# Patient Record
Sex: MALE | Race: BLACK OR AFRICAN AMERICAN | NOT HISPANIC OR LATINO | Employment: PART TIME | URBAN - METROPOLITAN AREA
[De-identification: names, ages, dates, MRNs, and addresses within clinical notes are randomized per-mention and may not be internally consistent; named-entity substitution may affect disease eponyms.]

---

## 2021-09-15 LAB
BASOPHILS # BLD AUTO: 0 10E3/UL (ref 0–0.2)
BASOPHILS NFR BLD AUTO: 0 %
EOSINOPHIL # BLD AUTO: 0.1 10E3/UL (ref 0–0.7)
EOSINOPHIL NFR BLD AUTO: 2 %
ERYTHROCYTE [DISTWIDTH] IN BLOOD BY AUTOMATED COUNT: 12.2 % (ref 10–15)
HCT VFR BLD AUTO: 41.5 % (ref 40–53)
HGB BLD-MCNC: 14.2 G/DL (ref 13.3–17.7)
IMM GRANULOCYTES # BLD: 0 10E3/UL
IMM GRANULOCYTES NFR BLD: 0 %
LYMPHOCYTES # BLD AUTO: 2.5 10E3/UL (ref 0.8–5.3)
LYMPHOCYTES NFR BLD AUTO: 36 %
MCH RBC QN AUTO: 28.5 PG (ref 26.5–33)
MCHC RBC AUTO-ENTMCNC: 34.2 G/DL (ref 31.5–36.5)
MCV RBC AUTO: 83 FL (ref 78–100)
MONOCYTES # BLD AUTO: 0.5 10E3/UL (ref 0–1.3)
MONOCYTES NFR BLD AUTO: 7 %
NEUTROPHILS # BLD AUTO: 3.7 10E3/UL (ref 1.6–8.3)
NEUTROPHILS NFR BLD AUTO: 55 %
NRBC # BLD AUTO: 0 10E3/UL
NRBC BLD AUTO-RTO: 0 /100
PLATELET # BLD AUTO: 239 10E3/UL (ref 150–450)
RBC # BLD AUTO: 4.99 10E6/UL (ref 4.4–5.9)
WBC # BLD AUTO: 6.8 10E3/UL (ref 4–11)

## 2021-09-15 PROCEDURE — 36415 COLL VENOUS BLD VENIPUNCTURE: CPT | Performed by: EMERGENCY MEDICINE

## 2021-09-15 PROCEDURE — 80053 COMPREHEN METABOLIC PANEL: CPT | Performed by: EMERGENCY MEDICINE

## 2021-09-15 PROCEDURE — 99285 EMERGENCY DEPT VISIT HI MDM: CPT | Mod: 25

## 2021-09-15 PROCEDURE — 85025 COMPLETE CBC W/AUTO DIFF WBC: CPT | Performed by: EMERGENCY MEDICINE

## 2021-09-15 PROCEDURE — 83690 ASSAY OF LIPASE: CPT | Performed by: EMERGENCY MEDICINE

## 2021-09-15 PROCEDURE — 96374 THER/PROPH/DIAG INJ IV PUSH: CPT | Mod: 59

## 2021-09-15 PROCEDURE — 96361 HYDRATE IV INFUSION ADD-ON: CPT

## 2021-09-15 ASSESSMENT — MIFFLIN-ST. JEOR: SCORE: 1668.63

## 2021-09-16 ENCOUNTER — HOSPITAL ENCOUNTER (EMERGENCY)
Facility: CLINIC | Age: 51
Discharge: HOME | End: 2021-09-16
Attending: EMERGENCY MEDICINE | Admitting: EMERGENCY MEDICINE
Payer: COMMERCIAL

## 2021-09-16 ENCOUNTER — APPOINTMENT (OUTPATIENT)
Dept: CT IMAGING | Facility: CLINIC | Age: 51
End: 2021-09-16
Attending: EMERGENCY MEDICINE
Payer: COMMERCIAL

## 2021-09-16 VITALS
DIASTOLIC BLOOD PRESSURE: 94 MMHG | HEIGHT: 67 IN | TEMPERATURE: 98 F | RESPIRATION RATE: 20 BRPM | WEIGHT: 187.39 LBS | SYSTOLIC BLOOD PRESSURE: 146 MMHG | OXYGEN SATURATION: 100 % | BODY MASS INDEX: 29.41 KG/M2 | HEART RATE: 84 BPM

## 2021-09-16 DIAGNOSIS — R19.7 DIARRHEA OF PRESUMED INFECTIOUS ORIGIN: ICD-10-CM

## 2021-09-16 LAB
ALBUMIN SERPL-MCNC: 3.5 G/DL (ref 3.4–5)
ALP SERPL-CCNC: 88 U/L (ref 40–150)
ALT SERPL W P-5'-P-CCNC: 37 U/L (ref 0–70)
ANION GAP SERPL CALCULATED.3IONS-SCNC: 2 MMOL/L (ref 3–14)
AST SERPL W P-5'-P-CCNC: 13 U/L (ref 0–45)
BILIRUB SERPL-MCNC: 0.3 MG/DL (ref 0.2–1.3)
BUN SERPL-MCNC: 11 MG/DL (ref 7–30)
C COLI+JEJUNI+LARI FUSA STL QL NAA+PROBE: NOT DETECTED
C DIFF TOX B STL QL: NEGATIVE
CALCIUM SERPL-MCNC: 8.6 MG/DL (ref 8.5–10.1)
CHLORIDE BLD-SCNC: 105 MMOL/L (ref 94–109)
CO2 SERPL-SCNC: 31 MMOL/L (ref 20–32)
CREAT SERPL-MCNC: 0.87 MG/DL (ref 0.66–1.25)
EC STX1 GENE STL QL NAA+PROBE: NOT DETECTED
EC STX2 GENE STL QL NAA+PROBE: NOT DETECTED
GFR SERPL CREATININE-BSD FRML MDRD: >90 ML/MIN/1.73M2
GLUCOSE BLD-MCNC: 159 MG/DL (ref 70–99)
HOLD SPECIMEN: NORMAL
HOLD SPECIMEN: NORMAL
LIPASE SERPL-CCNC: 78 U/L (ref 73–393)
NOROV GI+II ORF1-ORF2 JNC STL QL NAA+PR: NOT DETECTED
POTASSIUM BLD-SCNC: 3.2 MMOL/L (ref 3.4–5.3)
PROT SERPL-MCNC: 7.3 G/DL (ref 6.8–8.8)
RVA NSP5 STL QL NAA+PROBE: NOT DETECTED
SALMONELLA SP RPOD STL QL NAA+PROBE: NOT DETECTED
SHIGELLA SP+EIEC IPAH STL QL NAA+PROBE: NOT DETECTED
SODIUM SERPL-SCNC: 138 MMOL/L (ref 133–144)
V CHOL+PARA RFBL+TRKH+TNAA STL QL NAA+PR: NOT DETECTED
Y ENTERO RECN STL QL NAA+PROBE: NOT DETECTED

## 2021-09-16 PROCEDURE — 250N000013 HC RX MED GY IP 250 OP 250 PS 637: Performed by: EMERGENCY MEDICINE

## 2021-09-16 PROCEDURE — 87493 C DIFF AMPLIFIED PROBE: CPT | Performed by: EMERGENCY MEDICINE

## 2021-09-16 PROCEDURE — 87506 IADNA-DNA/RNA PROBE TQ 6-11: CPT | Performed by: EMERGENCY MEDICINE

## 2021-09-16 PROCEDURE — 258N000003 HC RX IP 258 OP 636: Performed by: EMERGENCY MEDICINE

## 2021-09-16 PROCEDURE — 250N000009 HC RX 250: Performed by: EMERGENCY MEDICINE

## 2021-09-16 PROCEDURE — 250N000011 HC RX IP 250 OP 636: Performed by: EMERGENCY MEDICINE

## 2021-09-16 PROCEDURE — 74177 CT ABD & PELVIS W/CONTRAST: CPT

## 2021-09-16 RX ORDER — ACETAMINOPHEN 500 MG
1000 TABLET ORAL ONCE
Status: COMPLETED | OUTPATIENT
Start: 2021-09-16 | End: 2021-09-16

## 2021-09-16 RX ORDER — IOPAMIDOL 755 MG/ML
94 INJECTION, SOLUTION INTRAVASCULAR ONCE
Status: COMPLETED | OUTPATIENT
Start: 2021-09-16 | End: 2021-09-16

## 2021-09-16 RX ORDER — IBUPROFEN 600 MG/1
600 TABLET, FILM COATED ORAL ONCE
Status: COMPLETED | OUTPATIENT
Start: 2021-09-16 | End: 2021-09-16

## 2021-09-16 RX ADMIN — IOPAMIDOL 94 ML: 755 INJECTION, SOLUTION INTRAVENOUS at 02:12

## 2021-09-16 RX ADMIN — IBUPROFEN 600 MG: 600 TABLET ORAL at 02:01

## 2021-09-16 RX ADMIN — SODIUM CHLORIDE 67 ML: 9 INJECTION, SOLUTION INTRAVENOUS at 02:13

## 2021-09-16 RX ADMIN — FAMOTIDINE 20 MG: 10 INJECTION, SOLUTION INTRAVENOUS at 02:05

## 2021-09-16 RX ADMIN — SODIUM CHLORIDE, POTASSIUM CHLORIDE, SODIUM LACTATE AND CALCIUM CHLORIDE 1000 ML: 600; 310; 30; 20 INJECTION, SOLUTION INTRAVENOUS at 02:04

## 2021-09-16 RX ADMIN — ACETAMINOPHEN 1000 MG: 500 TABLET, FILM COATED ORAL at 02:01

## 2021-09-16 ASSESSMENT — ENCOUNTER SYMPTOMS
DIARRHEA: 1
NAUSEA: 0
FEVER: 0
VOMITING: 0
SHORTNESS OF BREATH: 0
BLOOD IN STOOL: 0
COUGH: 0
ABDOMINAL PAIN: 1

## 2021-09-16 NOTE — ED PROVIDER NOTES
"  History     Chief Complaint:    Abdominal Pain      HPI   Francisco Abernathy is a 51 year old male who presents with abdominal pain. The abdominal pain started back on this past Saturday. He scheduled an appointment with a doctor, but that the pain worsened to the point that he came to the ED before the appointment with the doctor. He also has had 10-11 episodes of diarrhea today as well. The diarrhea has gotten to the point where he notices some rectal bruising and a burning sensation in his abdomen. He has no fever, nausea or vomiting. No one else is sick around him, and he had no recent surgeries. He denies the use of tobacco and alcohol.    Review of Systems   Constitutional: Negative for fever.   Respiratory: Negative for cough and shortness of breath.    Cardiovascular: Negative for chest pain.   Gastrointestinal: Positive for abdominal pain and diarrhea. Negative for blood in stool, nausea and vomiting.   All other systems reviewed and are negative.        Allergies:  No Known Allergies      Medications:    No known medicatins      Past Medical History:    Patient denies past medical history     Social History:  Patient presents to the ED alone  Patient does not speak english    Physical Exam     Patient Vitals for the past 24 hrs:   BP Temp Temp src Pulse Resp SpO2 Height Weight   09/16/21 0245 -- -- -- -- -- 99 % -- --   09/16/21 0230 -- -- -- -- -- 100 % -- --   09/16/21 0210 -- -- -- -- -- 100 % -- --   09/15/21 2341 (!) 148/93 98  F (36.7  C) Temporal 91 20 99 % 1.702 m (5' 7\") 85 kg (187 lb 6.3 oz)       Physical Exam  General: Appears well-developed and well-nourished.   Head: No signs of trauma.   Mouth/Throat: Oropharynx is clear and moist.   CV: Normal rate and regular rhythm.    Resp: Effort normal and breath sounds normal. No respiratory distress.   GI: Soft. There is mild diffuse tenderness.  No rebound or guarding.  Normal bowel sounds.  No CVA tenderness.  MSK: Normal range of motion.   Neuro: " The patient is alert and oriented. Speech normal.  Skin: Skin is warm and dry. No rash noted.   Psych: normal mood and affect. behavior is normal.       Emergency Department Course     Imaging:  CT Abdomen Pelvis w Contrast  Preliminary Result  IMPRESSION: No acute abnormality identified in the abdomen or pelvis. Normal appendix.         Laboratory:  Enteric bacteria and virus panel by [facility] stool: pending  Clostridium difficile toxin B PCR: pending  CBC: WBC 6.8, HGB 14.2,   CMP: Potassium 3.2 L, Anion Gap 2 L, Glucose 159 H, o/w WNL (Creatinine 0.87)  Lipase: 78        Emergency Department Course:    Reviewed:  I reviewed nursing notes, vitals and past history    Assessments:  0126 I obtained history and examined the patient as noted above.   0340 I rechecked the patient and explained findings.         Interventions:  0201 Ibuprofen 600 mg PO  0201 Tylenol 1000 mg PO  0204 Lactated ringers 1000 mL IV  0205 Famotidine 20 mg IV    Disposition:  The patient was discharged to home.    Impression & Plan      Medical Decision Making:  Francisco Abernathy is a 51-year-old gentleman who presents due to abdominal pain and diarrhea.  He has had symptoms over the last few days before coming to the hospital. He has not had any blood in his stool and denies any fevers.  He has not taken anything for his symptoms. On my evaluation he did have some diffuse tenderness.  Blood work was obtained that was overall unremarkable. I did obtain a CT scan and this did not show any signs of acute process.  Patient was given IV fluids along with Tylenol and ibuprofen and did feel better.  After discussion with patient of the pros and cons, I did discuss that he could use Imodium to help slow his diarrhea along with continued symptomatic control and pushing plenty of fluids.  I also discussed drinking Gatorade to help both with energy and electrolytes.  Stool studies were sent and are pending.  Primary care doctor was given for follow up.   He also asked to see a stomach specialist.  I did give him the information for GI, but discussed that sometimes can take some time for him to see a specialist. He was instructed to return for worsening symptoms or further concerns.       Diagnosis:    ICD-10-CM    1. Diarrhea of presumed infectious origin  R19.7        Discharge Medications:  New Prescriptions    No medications on file         Scribe Disclosure:  ILivia Hired, am serving as a scribe at 1:25 AM on 9/16/2021 to document services personally performed by Saul Celis MD based on my observations and the provider's statements to me.      Saul Celis MD  09/16/21 0671

## 2021-09-16 NOTE — RESULT ENCOUNTER NOTE
Final Enteric Bacteria and Virus Panel by [facility] Stool is NEGATIVE for all tested organisms (bacteria/virus).  No treatment or change in treatment per [facility] Lab Result Enteric Bacteria and Virus Panel protocol.

## 2021-09-16 NOTE — DISCHARGE INSTRUCTIONS
Please continue with tylenol, ibuprofen, and plenty of fluids.  Please follow up with your doctor.  Your stool studies are still pending at this time.  If they are positive you will be contacted.    Purchase IMODIUM anti-diarrhea medicine and  Gatorade Sports Drink to keep you hydrated.

## 2021-09-16 NOTE — ED TRIAGE NOTES
Pt has had diarrhea started Saturday. Pt c/o cramping. Pt had an appointment scheduled for tomorrow but decided to come to the ER instead. Pt denies nausea. Denies bloody stools

## 2021-09-16 NOTE — RESULT ENCOUNTER NOTE
Final Clostridium Difficile toxin B PCR is NEGATIVE.    No treatment or change in treatment per [facility] ED Lab Result Clostridium difficile protocol.

## 2021-11-28 ENCOUNTER — HOSPITAL ENCOUNTER (EMERGENCY)
Facility: CLINIC | Age: 51
Discharge: HOME | End: 2021-11-28
Attending: NURSE PRACTITIONER | Admitting: NURSE PRACTITIONER
Payer: COMMERCIAL

## 2021-11-28 VITALS
SYSTOLIC BLOOD PRESSURE: 148 MMHG | HEART RATE: 91 BPM | OXYGEN SATURATION: 99 % | TEMPERATURE: 97.7 F | RESPIRATION RATE: 16 BRPM | DIASTOLIC BLOOD PRESSURE: 83 MMHG

## 2021-11-28 DIAGNOSIS — T23.221A PARTIAL THICKNESS BURN OF FINGER OF RIGHT HAND, INITIAL ENCOUNTER: ICD-10-CM

## 2021-11-28 PROCEDURE — 99283 EMERGENCY DEPT VISIT LOW MDM: CPT | Mod: 25

## 2021-11-28 PROCEDURE — 250N000009 HC RX 250: Performed by: NURSE PRACTITIONER

## 2021-11-28 PROCEDURE — 16020 DRESS/DEBRID P-THICK BURN S: CPT

## 2021-11-28 RX ORDER — SILVER SULFADIAZINE 10 MG/G
1 CREAM TOPICAL DAILY
Status: DISCONTINUED | OUTPATIENT
Start: 2021-11-28 | End: 2021-11-28 | Stop reason: HOSPADM

## 2021-11-28 RX ORDER — CEPHALEXIN 500 MG/1
500 CAPSULE ORAL 4 TIMES DAILY
Qty: 28 CAPSULE | Refills: 0 | Status: SHIPPED | OUTPATIENT
Start: 2021-11-28 | End: 2021-12-05

## 2021-11-28 RX ORDER — HYDROCODONE BITARTRATE AND ACETAMINOPHEN 5; 325 MG/1; MG/1
1 TABLET ORAL EVERY 6 HOURS PRN
Qty: 10 TABLET | Refills: 0 | Status: SHIPPED | OUTPATIENT
Start: 2021-11-28 | End: 2021-12-01

## 2021-11-28 RX ADMIN — SILVER SULFADIAZINE 1 G: 10 CREAM TOPICAL at 17:12

## 2021-11-28 ASSESSMENT — ENCOUNTER SYMPTOMS: WOUND: 1
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
## 2021-11-29 NOTE — DISCHARGE INSTRUCTIONS
Apply the Silvadene cream to the area daily. Change the dressing each day. Waer the splint until your thumb 9s healed.    Take the antibiotics four times a day for one week.

## 2022-03-08 ENCOUNTER — APPOINTMENT (OUTPATIENT)
Dept: GENERAL RADIOLOGY | Facility: CLINIC | Age: 52
End: 2022-03-08
Attending: EMERGENCY MEDICINE
Payer: COMMERCIAL

## 2022-03-08 ENCOUNTER — HOSPITAL ENCOUNTER (EMERGENCY)
Facility: CLINIC | Age: 52
Discharge: HOME | End: 2022-03-09
Attending: EMERGENCY MEDICINE | Admitting: EMERGENCY MEDICINE
Payer: COMMERCIAL

## 2022-03-08 ENCOUNTER — APPOINTMENT (OUTPATIENT)
Dept: CT IMAGING | Facility: CLINIC | Age: 52
End: 2022-03-08
Attending: EMERGENCY MEDICINE
Payer: COMMERCIAL

## 2022-03-08 DIAGNOSIS — S16.1XXA STRAIN OF NECK MUSCLE, INITIAL ENCOUNTER: ICD-10-CM

## 2022-03-08 DIAGNOSIS — S20.212A CONTUSION OF LEFT CHEST WALL, INITIAL ENCOUNTER: ICD-10-CM

## 2022-03-08 DIAGNOSIS — T14.8XXA CONTUSION OF LEFT CLAVICLE, INITIAL ENCOUNTER: ICD-10-CM

## 2022-03-08 PROCEDURE — 72125 CT NECK SPINE W/O DYE: CPT

## 2022-03-08 PROCEDURE — 99285 EMERGENCY DEPT VISIT HI MDM: CPT | Mod: 25

## 2022-03-08 PROCEDURE — 71101 X-RAY EXAM UNILAT RIBS/CHEST: CPT | Mod: LT

## 2022-03-08 PROCEDURE — 73030 X-RAY EXAM OF SHOULDER: CPT | Mod: LT

## 2022-03-08 PROCEDURE — 250N000013 HC RX MED GY IP 250 OP 250 PS 637: Performed by: EMERGENCY MEDICINE

## 2022-03-08 RX ORDER — IBUPROFEN 600 MG/1
600 TABLET, FILM COATED ORAL ONCE
Status: COMPLETED | OUTPATIENT
Start: 2022-03-08 | End: 2022-03-08

## 2022-03-08 RX ADMIN — IBUPROFEN 600 MG: 600 TABLET ORAL at 21:28

## 2022-03-08 ASSESSMENT — ENCOUNTER SYMPTOMS
NECK PAIN: 1
ARTHRALGIAS: 1

## 2022-03-08 NOTE — LETTER
March 9, 2022      To Whom It May Concern:      Francisco Abernathy was seen in our Emergency Department today, 03/09/22.  I expect his condition to improve over the next 3-4 days.  He may return to work when improved.    Sincerely,          Karoline Valentine RN

## 2022-03-08 NOTE — LETTER
March 9, 2022      To Whom It May Concern:      Francisco Abernathy was seen in our Emergency Department today, 03/09/22.  I expect his condition to improve over the next few days.  He may return to work/school when improved.    Sincerely,        Karoline Valentine RN

## 2022-03-09 VITALS
OXYGEN SATURATION: 98 % | DIASTOLIC BLOOD PRESSURE: 81 MMHG | WEIGHT: 185.19 LBS | HEART RATE: 103 BPM | BODY MASS INDEX: 29 KG/M2 | RESPIRATION RATE: 18 BRPM | TEMPERATURE: 98.3 F | SYSTOLIC BLOOD PRESSURE: 145 MMHG

## 2022-03-09 RX ORDER — IBUPROFEN 600 MG/1
600 TABLET, FILM COATED ORAL EVERY 6 HOURS PRN
Qty: 15 TABLET | Refills: 0 | Status: SHIPPED | OUTPATIENT
Start: 2022-03-09

## 2022-03-09 RX ORDER — HYDROCODONE BITARTRATE AND ACETAMINOPHEN 5; 325 MG/1; MG/1
1 TABLET ORAL EVERY 4 HOURS PRN
Qty: 12 TABLET | Refills: 0 | Status: SHIPPED | OUTPATIENT
Start: 2022-03-09
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
## 2022-03-20 ENCOUNTER — HOSPITAL ENCOUNTER (EMERGENCY)
Facility: CLINIC | Age: 52
End: 2022-03-20

## 2024-04-04 ENCOUNTER — HOSPITAL ENCOUNTER (EMERGENCY)
Facility: CLINIC | Age: 54
Discharge: HOME | End: 2024-04-05
Attending: EMERGENCY MEDICINE | Admitting: EMERGENCY MEDICINE
Payer: COMMERCIAL

## 2024-04-04 DIAGNOSIS — N20.1 RIGHT URETERAL STONE: ICD-10-CM

## 2024-04-04 LAB
ALBUMIN UR-MCNC: NEGATIVE MG/DL
APPEARANCE UR: CLEAR
BASOPHILS # BLD AUTO: 0 10E3/UL (ref 0–0.2)
BASOPHILS NFR BLD AUTO: 1 %
BILIRUB UR QL STRIP: NEGATIVE
COLOR UR AUTO: ABNORMAL
EOSINOPHIL # BLD AUTO: 0.2 10E3/UL (ref 0–0.7)
EOSINOPHIL NFR BLD AUTO: 4 %
ERYTHROCYTE [DISTWIDTH] IN BLOOD BY AUTOMATED COUNT: 12.3 % (ref 10–15)
GLUCOSE UR STRIP-MCNC: 150 MG/DL
HCT VFR BLD AUTO: 40.1 % (ref 40–53)
HGB BLD-MCNC: 13.5 G/DL (ref 13.3–17.7)
HGB UR QL STRIP: ABNORMAL
IMM GRANULOCYTES # BLD: 0 10E3/UL
IMM GRANULOCYTES NFR BLD: 0 %
KETONES UR STRIP-MCNC: NEGATIVE MG/DL
LEUKOCYTE ESTERASE UR QL STRIP: NEGATIVE
LYMPHOCYTES # BLD AUTO: 2.2 10E3/UL (ref 0.8–5.3)
LYMPHOCYTES NFR BLD AUTO: 34 %
MCH RBC QN AUTO: 29 PG (ref 26.5–33)
MCHC RBC AUTO-ENTMCNC: 33.7 G/DL (ref 31.5–36.5)
MCV RBC AUTO: 86 FL (ref 78–100)
MONOCYTES # BLD AUTO: 0.6 10E3/UL (ref 0–1.3)
MONOCYTES NFR BLD AUTO: 9 %
MUCOUS THREADS #/AREA URNS LPF: PRESENT /LPF
NEUTROPHILS # BLD AUTO: 3.3 10E3/UL (ref 1.6–8.3)
NEUTROPHILS NFR BLD AUTO: 52 %
NITRATE UR QL: NEGATIVE
NRBC # BLD AUTO: 0 10E3/UL
NRBC BLD AUTO-RTO: 0 /100
PH UR STRIP: 5.5 [PH] (ref 5–7)
PLATELET # BLD AUTO: 236 10E3/UL (ref 150–450)
RBC # BLD AUTO: 4.66 10E6/UL (ref 4.4–5.9)
RBC URINE: 32 /HPF
SP GR UR STRIP: 1.02 (ref 1–1.03)
UROBILINOGEN UR STRIP-MCNC: 2 MG/DL
WBC # BLD AUTO: 6.3 10E3/UL (ref 4–11)
WBC URINE: 11 /HPF

## 2024-04-04 PROCEDURE — 82565 ASSAY OF CREATININE: CPT | Performed by: EMERGENCY MEDICINE

## 2024-04-04 PROCEDURE — 87086 URINE CULTURE/COLONY COUNT: CPT | Performed by: EMERGENCY MEDICINE

## 2024-04-04 PROCEDURE — 99285 EMERGENCY DEPT VISIT HI MDM: CPT | Mod: 25

## 2024-04-04 PROCEDURE — 85025 COMPLETE CBC W/AUTO DIFF WBC: CPT | Performed by: EMERGENCY MEDICINE

## 2024-04-04 PROCEDURE — 81001 URINALYSIS AUTO W/SCOPE: CPT | Performed by: EMERGENCY MEDICINE

## 2024-04-04 PROCEDURE — 36415 COLL VENOUS BLD VENIPUNCTURE: CPT | Performed by: EMERGENCY MEDICINE

## 2024-04-04 ASSESSMENT — ACTIVITIES OF DAILY LIVING (ADL): ADLS_ACUITY_SCORE: 33

## 2024-04-05 ENCOUNTER — APPOINTMENT (OUTPATIENT)
Dept: CT IMAGING | Facility: CLINIC | Age: 54
End: 2024-04-05
Attending: EMERGENCY MEDICINE
Payer: COMMERCIAL

## 2024-04-05 VITALS
WEIGHT: 174 LBS | TEMPERATURE: 98.4 F | RESPIRATION RATE: 16 BRPM | OXYGEN SATURATION: 98 % | SYSTOLIC BLOOD PRESSURE: 140 MMHG | HEART RATE: 78 BPM | DIASTOLIC BLOOD PRESSURE: 81 MMHG | BODY MASS INDEX: 27.25 KG/M2

## 2024-04-05 LAB
ANION GAP SERPL CALCULATED.3IONS-SCNC: 9 MMOL/L (ref 7–15)
BUN SERPL-MCNC: 23.3 MG/DL (ref 6–20)
CALCIUM SERPL-MCNC: 8.7 MG/DL (ref 8.6–10)
CHLORIDE SERPL-SCNC: 102 MMOL/L (ref 98–107)
CREAT SERPL-MCNC: 1.32 MG/DL (ref 0.67–1.17)
DEPRECATED HCO3 PLAS-SCNC: 26 MMOL/L (ref 22–29)
EGFRCR SERPLBLD CKD-EPI 2021: 64 ML/MIN/1.73M2
GLUCOSE SERPL-MCNC: 150 MG/DL (ref 70–99)
POTASSIUM SERPL-SCNC: 4.3 MMOL/L (ref 3.4–5.3)
SODIUM SERPL-SCNC: 137 MMOL/L (ref 135–145)

## 2024-04-05 PROCEDURE — 96375 TX/PRO/DX INJ NEW DRUG ADDON: CPT

## 2024-04-05 PROCEDURE — 74176 CT ABD & PELVIS W/O CONTRAST: CPT

## 2024-04-05 PROCEDURE — 96361 HYDRATE IV INFUSION ADD-ON: CPT

## 2024-04-05 PROCEDURE — 96374 THER/PROPH/DIAG INJ IV PUSH: CPT

## 2024-04-05 PROCEDURE — 250N000011 HC RX IP 250 OP 636: Performed by: EMERGENCY MEDICINE

## 2024-04-05 PROCEDURE — 258N000003 HC RX IP 258 OP 636: Performed by: EMERGENCY MEDICINE

## 2024-04-05 RX ORDER — HYDROMORPHONE HYDROCHLORIDE 1 MG/ML
0.5 INJECTION, SOLUTION INTRAMUSCULAR; INTRAVENOUS; SUBCUTANEOUS EVERY 30 MIN PRN
Status: DISCONTINUED | OUTPATIENT
Start: 2024-04-05 | End: 2024-04-05 | Stop reason: HOSPADM

## 2024-04-05 RX ORDER — ONDANSETRON 2 MG/ML
4 INJECTION INTRAMUSCULAR; INTRAVENOUS EVERY 30 MIN PRN
Status: DISCONTINUED | OUTPATIENT
Start: 2024-04-05 | End: 2024-04-05 | Stop reason: HOSPADM

## 2024-04-05 RX ORDER — ONDANSETRON 4 MG/1
4 TABLET, ORALLY DISINTEGRATING ORAL EVERY 8 HOURS PRN
Qty: 10 TABLET | Refills: 0 | Status: SHIPPED | OUTPATIENT
Start: 2024-04-05 | End: 2024-04-08

## 2024-04-05 RX ORDER — OXYCODONE HYDROCHLORIDE 5 MG/1
5 TABLET ORAL EVERY 6 HOURS PRN
Qty: 12 TABLET | Refills: 0 | Status: SHIPPED | OUTPATIENT
Start: 2024-04-05 | End: 2024-04-08

## 2024-04-05 RX ORDER — TAMSULOSIN HYDROCHLORIDE 0.4 MG/1
0.4 CAPSULE ORAL DAILY
Qty: 10 CAPSULE | Refills: 0 | Status: SHIPPED | OUTPATIENT
Start: 2024-04-05 | End: 2024-04-15

## 2024-04-05 RX ADMIN — SODIUM CHLORIDE 1000 ML: 9 INJECTION, SOLUTION INTRAVENOUS at 01:22

## 2024-04-05 RX ADMIN — ONDANSETRON 4 MG: 2 INJECTION INTRAMUSCULAR; INTRAVENOUS at 01:23

## 2024-04-05 RX ADMIN — HYDROMORPHONE HYDROCHLORIDE 0.5 MG: 1 INJECTION, SOLUTION INTRAMUSCULAR; INTRAVENOUS; SUBCUTANEOUS at 01:23

## 2024-04-05 ASSESSMENT — ACTIVITIES OF DAILY LIVING (ADL)
ADLS_ACUITY_SCORE: 35

## 2024-04-05 NOTE — ED PROVIDER NOTES
History     Chief Complaint:  Flank Pain       The history is provided by the patient. A  was used ([employer]).      Francisco Abernathy is a 54 year old male with a history of type 2 diabetes, hypertension who presents with right flank pain. For the last 2 days the patient has been experiencing right sided flank pain, dysuria, and hematuria. He has not taken any medications for the pain. He denies any abdominal surgeries, no history of kidney stones. No fevers.     Independent Historian:   None - Patient Only    Review of External Notes:   None      Medications:    Glucophage   Desyrel   Lipitor   Norvasc     Past Medical History:    Hypertension  Type 2 diabetes   Insomnia   Mixed hyperlipidemia     Physical Exam   Patient Vitals for the past 24 hrs:   BP Temp Temp src Pulse Resp SpO2 Weight   04/04/24 2222 (!) 161/83 98.4  F (36.9  C) Temporal 81 18 99 % 78.9 kg (174 lb)        Physical Exam    General: Sitting up in bed  Eyes:  The pupils are equal and round    Conjunctivae and sclerae are normal  ENT:    Atraumatic face  Neck:  Normal range of motion  CV:  Regular rate    Skin warm and well perfused   Resp:  Non labored breathing on room air    No tachypnea    No cough heard  GI:  Abdomen is soft, there is no rigidity    No distension    No rebound tenderness     No abdominal tenderness  MS:  Normal muscular tone  Skin:  No rash or acute skin lesions noted  Neuro:   Awake, alert.      Speech is normal and fluent.    Face is symmetric.     Moves all extremities equally  Psych: Normal affect.  Appropriate interactions.    Emergency Department Course     Imaging:  CT Abdomen Pelvis w/o Contrast   Final Result   IMPRESSION:    1.  Mild right obstructive uropathy secondary to a 3-4 mm stone in the distal right ureter at the UVJ.            Laboratory:  Labs Ordered and Resulted from Time of ED Arrival to Time of ED Departure   UA MACROSCOPIC WITH REFLEX TO MICRO AND CULTURE - Abnormal       Result  Value    Color Urine Light Yellow      Appearance Urine Clear      Glucose Urine 150 (*)     Bilirubin Urine Negative      Ketones Urine Negative      Specific Gravity Urine 1.019      Blood Urine Moderate (*)     pH Urine 5.5      Protein Albumin Urine Negative      Urobilinogen Urine 2.0      Nitrite Urine Negative      Leukocyte Esterase Urine Negative      Mucus Urine Present (*)     RBC Urine 32 (*)     WBC Urine 11 (*)    BASIC METABOLIC PANEL - Abnormal    Sodium 137      Potassium 4.3      Chloride 102      Carbon Dioxide (CO2) 26      Anion Gap 9      Urea Nitrogen 23.3 (*)     Creatinine 1.32 (*)     GFR Estimate 64      Calcium 8.7      Glucose 150 (*)    CBC WITH PLATELETS AND DIFFERENTIAL    WBC Count 6.3      RBC Count 4.66      Hemoglobin 13.5      Hematocrit 40.1      MCV 86      MCH 29.0      MCHC 33.7      RDW 12.3      Platelet Count 236      % Neutrophils 52      % Lymphocytes 34      % Monocytes 9      % Eosinophils 4      % Basophils 1      % Immature Granulocytes 0      NRBCs per 100 WBC 0      Absolute Neutrophils 3.3      Absolute Lymphocytes 2.2      Absolute Monocytes 0.6      Absolute Eosinophils 0.2      Absolute Basophils 0.0      Absolute Immature Granulocytes 0.0      Absolute NRBCs 0.0     URINE CULTURE      Emergency Department Course & Assessments:    Interventions:  Medications   HYDROmorphone (PF) (DILAUDID) injection 0.5 mg (0.5 mg Intravenous $Given 4/5/24 0123)   ondansetron (ZOFRAN) injection 4 mg (4 mg Intravenous $Given 4/5/24 0123)   sodium chloride 0.9% BOLUS 1,000 mL (1,000 mLs Intravenous $New Bag 4/5/24 0122)      Assessments:  0111 I examined the patient and obtained history as shown above  0243 I rechecked the patient and explained exam results, pain is pretty much gone     Independent Interpretation (X-rays, CTs, rhythm strip):  None    Consultations/Discussion of Management or Tests:  None        Social Determinants of Health affecting care:    None    Disposition:  The patient was discharged.     Impression & Plan    CMS Diagnoses: None    MIPS (If applicable):  N/A    Medical Decision Making:  Francisco Abernathy is a 54 year old male who presents with right flank pain. A broad differential diagnosis was considered including diverticulitis, ureterolithiasis, tumor, colitis, cholecystitis, aneurysm, dissection, volvulus, appendicitis, splenic issue, stomach pathology, ulcer, hydronephrosis, pneumonia, rib fracture, UTI, pyelonephritis amongst many other etiologies. Signs and symptoms consistent with right ureteral stone. Patients pain is controlled in ED. No signs of infected stone. Creatinine mildly elevated, given IV fluids and encouraged to drink fluids at home. Patient is hemodynamically stable in ED.   Return for fevers greater than 102, increasing pain. Urology consult placed. Ureterolithiasis precautions for home.  Questions were answered.   Diagnosis:    ICD-10-CM    1. Right ureteral stone  N20.1 Adult Urology  Referral         Discharge Medications:  New Prescriptions    ONDANSETRON (ZOFRAN ODT) 4 MG ODT TAB    Take 1 tablet (4 mg) by mouth every 8 hours as needed for nausea    OXYCODONE (ROXICODONE) 5 MG TABLET    Take 1 tablet (5 mg) by mouth every 6 hours as needed for pain    TAMSULOSIN (FLOMAX) 0.4 MG CAPSULE    Take 1 capsule (0.4 mg) by mouth daily for 10 doses      Scribe Disclosure:  Oumar HUDDLESTON, am serving as a scribe at 12:16 AM on 4/5/2024 to document services personally performed by Ana Maria Burris MD based on my observations and the provider's statements to me.     4/5/2024   Ana Maria Burris MD Goertz, Maria Kristine, MD  04/05/24 0252

## 2024-04-05 NOTE — ED TRIAGE NOTES
Pt reports jany red blood in urine, 3x days of flank pain. Pt denies hx of kidney stones. Pt 2x days of burning, frequency, urgency of urination.     Triage completed with [place] Interpretor      Triage Assessment (Adult)       Row Name 04/04/24 2611          Triage Assessment    Airway WDL WDL        Cognitive/Neuro/Behavioral WDL    Cognitive/Neuro/Behavioral WDL WDL

## 2024-04-05 NOTE — DISCHARGE INSTRUCTIONS
Tylenol as needed for pain. Occasional ibuprofen use okay but would limit this due to mildly elevated kidney function today  Oxycodone for more severe pain  Flomax daily till you pass stone  Strain urine when you urinate to try to collect stone  Someone should call you to arrange follow-up with urology clinic  Make sure to drink lots of fluids    Opioid Medication Information    You have been given a prescription for an opioid (narcotic) pain medicine and/or have received a pain medicine while here in the Emergency Department. These medicines can make you drowsy or impaired. You must not drive, operate dangerous equipment, or engage in any other dangerous activities while taking these medications. If you drive while taking these medications, you could be arrested for driving under the influence (DUI). Do not drink any alcohol while you are taking these medications.     Opioid pain medications can cause addiction. If you have a history of chemical dependency of any type, you are at a higher risk of becoming addicted to pain medications.  Only take these prescribed medications to treat your pain when all other options have been tried. Take it for as short a time and as few doses as possible. Store your pain pills in a secure place, as they are frequently stolen and provide a dangerous opportunity for children or visitors in your house to start abusing these powerful medications. We will not replace any lost or stolen medicine.    If you do not finish your medication, it is a good idea to get rid of it but please do not flush it down the toilet. Please dispose of the remaining medication at a local pharmacy or law enforcement facility. The [place] [employer] has additional information on medication disposal: https://www.pca.Counts include 234 beds at the [facility].[email or web address]/living-[email or web address]/[email or web address]-medications.      Many prescription pain medications contain Tylenol  (acetaminophen), including Vicodin , Tylenol #3 , Norco , Lortab ,  and Percocet .  You should not take any extra pills of Tylenol  if you are using these prescription medications or you can get very sick.  Do not ever take more than 3000 mg of acetaminophen in any 24 hour period.    All opioids tend to cause constipation. Drink plenty of water and eat foods that have a lot of fiber, such as fruits, vegetables, prune juice, apple juice and high fiber cereal.  Take a laxative if you don t move your bowels at least every other day. Miralax , Milk of Magnesia, Colace , or Senna  can be used to keep you regular.

## 2024-04-06 LAB — BACTERIA UR CULT: NO GROWTH

## 2024-04-08 ENCOUNTER — OFFICE VISIT (OUTPATIENT)
Dept: UROLOGY | Facility: CLINIC | Age: 54
End: 2024-04-08
Payer: COMMERCIAL

## 2024-04-08 VITALS — DIASTOLIC BLOOD PRESSURE: 70 MMHG | BODY MASS INDEX: 27.25 KG/M2 | WEIGHT: 174 LBS | SYSTOLIC BLOOD PRESSURE: 128 MMHG

## 2024-04-08 DIAGNOSIS — N20.1 RIGHT URETERAL STONE: Primary | ICD-10-CM

## 2024-04-08 PROCEDURE — 99204 OFFICE O/P NEW MOD 45 MIN: CPT | Performed by: STUDENT IN AN ORGANIZED HEALTH CARE EDUCATION/TRAINING PROGRAM

## 2024-04-08 RX ORDER — TAMSULOSIN HYDROCHLORIDE 0.4 MG/1
0.4 CAPSULE ORAL DAILY
Qty: 10 CAPSULE | Refills: 0 | Status: SHIPPED | OUTPATIENT
Start: 2024-04-08 | End: 2024-05-06

## 2024-04-08 RX ORDER — TAMSULOSIN HYDROCHLORIDE 0.4 MG/1
0.4 CAPSULE ORAL DAILY
Qty: 10 CAPSULE | Refills: 0 | Status: SHIPPED | OUTPATIENT
Start: 2024-04-08 | End: 2024-04-08

## 2024-04-08 RX ORDER — OXYCODONE HYDROCHLORIDE 5 MG/1
5 TABLET ORAL EVERY 6 HOURS PRN
Qty: 6 TABLET | Refills: 0 | Status: SHIPPED | OUTPATIENT
Start: 2024-04-08 | End: 2024-04-08

## 2024-04-08 RX ORDER — OXYCODONE HYDROCHLORIDE 5 MG/1
5 TABLET ORAL EVERY 6 HOURS PRN
Qty: 6 TABLET | Refills: 0 | Status: SHIPPED | OUTPATIENT
Start: 2024-04-08

## 2024-04-08 ASSESSMENT — PAIN SCALES - GENERAL: PAINLEVEL: NO PAIN (0)

## 2024-04-08 NOTE — LETTER
4/8/2024       RE: Francisco Abernathy  [address] [facility] [address]     Dear Colleague,    Thank you for referring your patient, Francisco Abernathy, to the [facility] [place] at [facility]. Please see a copy of my visit note below.          Chief Complaint:    Kidney/Ureteral Stone           Consult or Referral:     Mr. Francisco Abernathy is a 54 year old male seen at the request of Dr. Burris.         History of Present Illness:     Francisco Abernathy is a 54 year old male being seen for ureteral stone.  Duration of problem: 1 week  Previous treatments: Medical expulsive therapy      Reviewed previous notes from Dr. Burris    Tereza presents with history of right distal ureteral stone 4 days ago in the ER  Much less symptomatic right now  Has not documented stone passage yet  No other issues to report  No prior history of kidney stones           Past Medical Histo   No past medical history on file.         Past Surgical History:   No past surgical history on file.         Medications     Current Outpatient Medications   Medication Sig Dispense Refill    HYDROcodone-acetaminophen (NORCO) 5-325 MG tablet Take 1 tablet by mouth every 4 hours as needed for pain No driving a car or drinking alcohol for 8 hours after taking this medication. 12 tablet 0    ibuprofen (ADVIL/MOTRIN) 600 MG tablet Take 1 tablet (600 mg) by mouth every 6 hours as needed (pain) Take with food 15 tablet 0    oxyCODONE (ROXICODONE) 5 MG tablet Take 1 tablet (5 mg) by mouth every 6 hours as needed for pain 6 tablet 0    tamsulosin (FLOMAX) 0.4 MG capsule Take 1 capsule (0.4 mg) by mouth daily 10 capsule 0    tamsulosin (FLOMAX) 0.4 MG capsule Take 1 capsule (0.4 mg) by mouth daily for 10 doses 10 capsule 0     No current facility-administered medications for this visit.            Family History:   No family history on file.         Social History:     Social History      Socioeconomic History    Marital status:      Spouse name: Not on file    Number of children: Not on file    Years of education: Not on file    Highest education level: Not on file   Occupational History    Not on file   Tobacco Use    Smoking status: Never    Smokeless tobacco: Never   Substance and Sexual Activity    Alcohol use: Never    Drug use: Never    Sexual activity: Not on file   Other Topics Concern    Not on file   Social History Narrative    Not on file     Social Determinants of Health     Financial Resource Strain: Not on file   Food Insecurity: Not on file   Transportation Needs: Not on file   Physical Activity: Not on file   Stress: Not on file   Social Connections: Not on file   Interpersonal Safety: Not on file   Housing Stability: Not on file            Allergies:   Patient has no known allergies.         Review of Systems:  From intake questionnaire     Skin: negative  Eyes: negative  Ears/Nose/Throat: negative  Respiratory: No shortness of breath, dyspnea on exertion, cough, or hemoptysis  Cardiovascular: No chest pain or palpitations  Gastrointestinal: negative; no nausea/vomiting, constipation or diarrhea  Genitourinary: as per HPI  Musculoskeletal: negative  Neurologic: negative  Psychiatric: negative  Hematologic/Lymphatic/Immunologic: negative  Endocrine: negative         Physical Exam:     Patient is a 54 year old  male   Vitals: Blood pressure 128/70, weight 78.9 kg (174 lb).  Constitutional: Body mass index is 27.25 kg/m .  Alert, no acute distress, oriented, conversant  Eyes: no scleral icterus; extraocular muscles intact, moist conjunctivae  Neck: trachea midline, no thyromegaly  Ears/nose/mouth: throat/mouth:normal, good dentition  Respiratory: no respiratory distress, or pursed lip breathing  Cardiovascular: pulses strong and intact; no obvious jugular venous distension present  Gastrointestinal: soft, nontender, no organomegaly or masses,   Lymphatics: No inguinal  "adenopathy  Musculoskeletal: extremities normal, no peripheral edema  Skin: no suspicious lesions or rashes  Neuro: Alert, oriented, speech and mentation normal  Psych: affect and mood normal, alert and oriented to person, place and time  Gait: Normal  : deferred      Labs and Pathology:    The following labs were reviewed by me and discussed with the patient:  UA: Abnormal: Moderate blood present RBCs, 32 per high-power field  Urine Culture: Normal  Significant for   Lab Results   Component Value Date    CR 1.32 04/04/2024    CR 0.87 09/15/2021     No results found for: \"PSA\"          Imaging:    The following imaging exams were independently viewed and interpreted by me and discussed with patient:  CT Scan Abd/Pelvis: Abnormal: EXAM: CT ABDOMEN PELVIS W/O CONTRAST  LOCATION: [facility]  DATE: 4/5/2024     INDICATION: flank pain, hematuria  COMPARISON: 09/16/2021.  TECHNIQUE: CT scan of the abdomen and pelvis was performed without IV contrast. Multiplanar reformats were obtained. Dose reduction techniques were used.  CONTRAST: None.     FINDINGS:   LOWER CHEST: Normal.     HEPATOBILIARY: Normal.     PANCREAS: Normal.     SPLEEN: Normal.     ADRENAL GLANDS: Normal.     KIDNEYS/BLADDER: Mild right hydronephrosis, hydroureter, and perinephric edema. There is a calcified 3-4 mm stone in the distal right ureter at the UVJ. Urinary bladder unremarkable. No left-sided urinary obstruction. No renal calcification bilaterally.     BOWEL: Normal.     LYMPH NODES: Normal.     VASCULATURE: Normal.     PELVIC ORGANS: Normal.     MUSCULOSKELETAL: Normal.                                                                      IMPRESSION:   1.  Mild right obstructive uropathy secondary to a 3-4 mm stone in the distal right ureter at the UVJ.                  Assessment and Plan:     Right ureteral stone  Distal ureteral stone on the right side  Based on the CT picture it is almost about to come out of the " ureter  He does have occasional significant pain and needs pain medications  Recommended continued medical expulsive therapy with tamsulosin for at least 2 more weeks  Would encourage increasing fluid intake to at least 80 to 100 ounces to facilitate stone passage  If unable to pass the stone in the next 2 weeks he should call our office and we could set up a CT without contrast to document stone passage  If unable to pass the stone in the next 2 weeks or needs to come back to the ER multiple times with pain may need to be considered for ureteroscopy  He expressed understanding of this plan and was agreeable  - Adult Urology  Referral  - oxyCODONE (ROXICODONE) 5 MG tablet; Take 1 tablet (5 mg) by mouth every 6 hours as needed for pain  - tamsulosin (FLOMAX) 0.4 MG capsule; Take 1 capsule (0.4 mg) by mouth daily      Plan:  Continue medical expulsive therapy  If unable to document stone passage plan for CT/ureteroscope    Orders  No orders of the defined types were placed in this encounter.      Garry Gill MD  [facility] [place]      ==========================    Additional Billing and Coding Information:  Review of external notes as documented above   Review of the result(s) of each unique test - CT abdomen pelvis without contrast, creatinine, UA, urine culture                18 minutes spent by me on the date of the encounter doing chart review, review of test results, interpretation of tests, patient visit, and documentation     ==========================

## 2024-04-08 NOTE — PATIENT INSTRUCTIONS
Continue flomax for 2 weeks  If unable to pass the stone in the next 2-3 weeks will plan for a CT

## 2024-04-08 NOTE — NURSING NOTE
Chief Complaint   Patient presents with    Kidney Stone Related     Pt had gross hematuria initially.  Pt is not sure if there is still blood present or if he has passed a stone.    Stephanie Austin, EMT

## 2024-04-15 NOTE — PROGRESS NOTES
Chief Complaint:    Kidney/Ureteral Stone           Consult or Referral:     Mr. Francisco Abernathy is a 54 year old male seen at the request of Dr. Burris.         History of Present Illness:     Francisco Abernathy is a 54 year old male being seen for ureteral stone.  Duration of problem: 1 week  Previous treatments: Medical expulsive therapy      Reviewed previous notes from Dr. Burris    Tereza presents with history of right distal ureteral stone 4 days ago in the ER  Much less symptomatic right now  Has not documented stone passage yet  No other issues to report  No prior history of kidney stones           Past Medical Histo   No past medical history on file.         Past Surgical History:   No past surgical history on file.         Medications     Current Outpatient Medications   Medication Sig Dispense Refill    HYDROcodone-acetaminophen (NORCO) 5-325 MG tablet Take 1 tablet by mouth every 4 hours as needed for pain No driving a car or drinking alcohol for 8 hours after taking this medication. 12 tablet 0    ibuprofen (ADVIL/MOTRIN) 600 MG tablet Take 1 tablet (600 mg) by mouth every 6 hours as needed (pain) Take with food 15 tablet 0    oxyCODONE (ROXICODONE) 5 MG tablet Take 1 tablet (5 mg) by mouth every 6 hours as needed for pain 6 tablet 0    tamsulosin (FLOMAX) 0.4 MG capsule Take 1 capsule (0.4 mg) by mouth daily 10 capsule 0    tamsulosin (FLOMAX) 0.4 MG capsule Take 1 capsule (0.4 mg) by mouth daily for 10 doses 10 capsule 0     No current facility-administered medications for this visit.            Family History:   No family history on file.         Social History:     Social History     Socioeconomic History    Marital status:      Spouse name: Not on file    Number of children: Not on file    Years of education: Not on file    Highest education level: Not on file   Occupational History    Not on file   Tobacco Use    Smoking status: Never    Smokeless tobacco: Never   Substance and  Sexual Activity    Alcohol use: Never    Drug use: Never    Sexual activity: Not on file   Other Topics Concern    Not on file   Social History Narrative    Not on file     Social Determinants of Health     Financial Resource Strain: Not on file   Food Insecurity: Not on file   Transportation Needs: Not on file   Physical Activity: Not on file   Stress: Not on file   Social Connections: Not on file   Interpersonal Safety: Not on file   Housing Stability: Not on file            Allergies:   Patient has no known allergies.         Review of Systems:  From intake questionnaire     Skin: negative  Eyes: negative  Ears/Nose/Throat: negative  Respiratory: No shortness of breath, dyspnea on exertion, cough, or hemoptysis  Cardiovascular: No chest pain or palpitations  Gastrointestinal: negative; no nausea/vomiting, constipation or diarrhea  Genitourinary: as per HPI  Musculoskeletal: negative  Neurologic: negative  Psychiatric: negative  Hematologic/Lymphatic/Immunologic: negative  Endocrine: negative         Physical Exam:     Patient is a 54 year old  male   Vitals: Blood pressure 128/70, weight 78.9 kg (174 lb).  Constitutional: Body mass index is 27.25 kg/m .  Alert, no acute distress, oriented, conversant  Eyes: no scleral icterus; extraocular muscles intact, moist conjunctivae  Neck: trachea midline, no thyromegaly  Ears/nose/mouth: throat/mouth:normal, good dentition  Respiratory: no respiratory distress, or pursed lip breathing  Cardiovascular: pulses strong and intact; no obvious jugular venous distension present  Gastrointestinal: soft, nontender, no organomegaly or masses,   Lymphatics: No inguinal adenopathy  Musculoskeletal: extremities normal, no peripheral edema  Skin: no suspicious lesions or rashes  Neuro: Alert, oriented, speech and mentation normal  Psych: affect and mood normal, alert and oriented to person, place and time  Gait: Normal  : deferred      Labs and Pathology:    The following labs were  "reviewed by me and discussed with the patient:  UA: Abnormal: Moderate blood present RBCs, 32 per high-power field  Urine Culture: Normal  Significant for   Lab Results   Component Value Date    CR 1.32 04/04/2024    CR 0.87 09/15/2021     No results found for: \"PSA\"          Imaging:    The following imaging exams were independently viewed and interpreted by me and discussed with patient:  CT Scan Abd/Pelvis: Abnormal: EXAM: CT ABDOMEN PELVIS W/O CONTRAST  LOCATION: [facility]  DATE: 4/5/2024     INDICATION: flank pain, hematuria  COMPARISON: 09/16/2021.  TECHNIQUE: CT scan of the abdomen and pelvis was performed without IV contrast. Multiplanar reformats were obtained. Dose reduction techniques were used.  CONTRAST: None.     FINDINGS:   LOWER CHEST: Normal.     HEPATOBILIARY: Normal.     PANCREAS: Normal.     SPLEEN: Normal.     ADRENAL GLANDS: Normal.     KIDNEYS/BLADDER: Mild right hydronephrosis, hydroureter, and perinephric edema. There is a calcified 3-4 mm stone in the distal right ureter at the UVJ. Urinary bladder unremarkable. No left-sided urinary obstruction. No renal calcification bilaterally.     BOWEL: Normal.     LYMPH NODES: Normal.     VASCULATURE: Normal.     PELVIC ORGANS: Normal.     MUSCULOSKELETAL: Normal.                                                                      IMPRESSION:   1.  Mild right obstructive uropathy secondary to a 3-4 mm stone in the distal right ureter at the UVJ.                  Assessment and Plan:     Right ureteral stone  Distal ureteral stone on the right side  Based on the CT picture it is almost about to come out of the ureter  He does have occasional significant pain and needs pain medications  Recommended continued medical expulsive therapy with tamsulosin for at least 2 more weeks  Would encourage increasing fluid intake to at least 80 to 100 ounces to facilitate stone passage  If unable to pass the stone in the next 2 weeks he " should call our office and we could set up a CT without contrast to document stone passage  If unable to pass the stone in the next 2 weeks or needs to come back to the ER multiple times with pain may need to be considered for ureteroscopy  He expressed understanding of this plan and was agreeable  - Adult Urology  Referral  - oxyCODONE (ROXICODONE) 5 MG tablet; Take 1 tablet (5 mg) by mouth every 6 hours as needed for pain  - tamsulosin (FLOMAX) 0.4 MG capsule; Take 1 capsule (0.4 mg) by mouth daily      Plan:  Continue medical expulsive therapy  If unable to document stone passage plan for CT/ureteroscope    Orders  No orders of the defined types were placed in this encounter.      Garry Gill MD  [facility] [place]      ==========================    Additional Billing and Coding Information:  Review of external notes as documented above   Review of the result(s) of each unique test - CT abdomen pelvis without contrast, creatinine, UA, urine culture                18 minutes spent by me on the date of the encounter doing chart review, review of test results, interpretation of tests, patient visit, and documentation     ==========================

## 2024-05-06 DIAGNOSIS — N20.1 RIGHT URETERAL STONE: ICD-10-CM

## 2024-05-07 RX ORDER — TAMSULOSIN HYDROCHLORIDE 0.4 MG/1
0.4 CAPSULE ORAL DAILY
Qty: 10 CAPSULE | Refills: 0 | Status: SHIPPED | OUTPATIENT
Start: 2024-05-07

## 2024-08-22 ENCOUNTER — HOSPITAL ENCOUNTER (EMERGENCY)
Facility: CLINIC | Age: 54
Discharge: HOME | End: 2024-08-22
Attending: EMERGENCY MEDICINE | Admitting: EMERGENCY MEDICINE
Payer: COMMERCIAL

## 2024-08-22 VITALS
TEMPERATURE: 98.2 F | RESPIRATION RATE: 18 BRPM | HEART RATE: 78 BPM | DIASTOLIC BLOOD PRESSURE: 78 MMHG | BODY MASS INDEX: 25.9 KG/M2 | SYSTOLIC BLOOD PRESSURE: 150 MMHG | WEIGHT: 165.34 LBS | OXYGEN SATURATION: 100 %

## 2024-08-22 DIAGNOSIS — K60.2 ANAL FISSURE: ICD-10-CM

## 2024-08-22 DIAGNOSIS — K62.89 RECTAL PAIN: ICD-10-CM

## 2024-08-22 PROCEDURE — 99284 EMERGENCY DEPT VISIT MOD MDM: CPT

## 2024-08-22 RX ORDER — LIDOCAINE 40 MG/G
CREAM TOPICAL 3 TIMES DAILY PRN
Qty: 120 G | Refills: 0 | Status: SHIPPED | OUTPATIENT
Start: 2024-08-22

## 2024-08-22 RX ORDER — ANORECTAL OINTMENT 15.7; .44; 24; 20.6 G/100G; G/100G; G/100G; G/100G
OINTMENT TOPICAL 4 TIMES DAILY PRN
Qty: 100 G | Refills: 0 | Status: SHIPPED | OUTPATIENT
Start: 2024-08-22

## 2024-08-22 RX ORDER — LIDOCAINE HYDROCHLORIDE 20 MG/ML
JELLY TOPICAL EVERY 4 HOURS PRN
Status: DISCONTINUED | OUTPATIENT
Start: 2024-08-22 | End: 2024-08-23 | Stop reason: HOSPADM

## 2024-08-22 ASSESSMENT — ACTIVITIES OF DAILY LIVING (ADL)
ADLS_ACUITY_SCORE: 35

## 2024-08-23 NOTE — ED PROVIDER NOTES
Emergency Department Note      History of Present Illness     Chief Complaint   Rectal/perineal Pain    HPI   Francisco Abernathy is a 54 year old male with a history of type II diabetes, hypertension, and hyperlipidemia presenting with rectal pain. For 3 weeks the patient's rectum has been itchy. He was seen by primary care and was prescribed hydrocortisone cream. He states that he believes that the cream is now causing burning when applied. His pain has been worsening for the past 2 days.  He has even more pain when attempting to have a bowel movement.  The patient was having diarrhea, but now is having soft stools. The patient denies nausea, vomiting, fever, abdominal pain, or pain with urination.     Independent Historian   A [employer]  was used.     Review of External Notes   I reviewed the family medicine clinic note from 8/15/2024.    Past Medical History     Medical History and Problem List   Diabetes, type II  Hypertension  Hyperlipidemia  Erectile dysfunction  Reduced libido  Insomnia  Ance vulgaris      Medications   Amlodipine   Atorvastatin   Hydrocodone-acetaminophen   Oxycodone   Tamsulosin   Trazodone   Metfomin   Clindamycin   Lancets  Tazarotene   Lidocaine cream  Hydrocortisone cream  Sildenafil     Surgical History   History reviewed. No pertinent surgical history.    Physical Exam     Patient Vitals for the past 24 hrs:   BP Temp Temp src Pulse Resp SpO2 Weight   08/22/24 2030 (!) 150/78 98.2  F (36.8  C) Oral 78 18 100 % 75 kg (165 lb 5.5 oz)     Physical Exam  Nursing note and vitals reviewed.  Constitutional:  Oriented to person, place, and time. Cooperative.   HENT:   Nose:    Nose normal.   Mouth/Throat:   Mucous membranes are normal.   Eyes:    Conjunctivae normal and EOM are normal.      Pupils are equal, round, and reactive to light.   Neck:    Trachea normal.   Cardiovascular:  Normal rate, regular rhythm, normal heart sounds and normal pulses. No murmur heard.  Pulmonary/Chest:   Effort normal and breath sounds normal.   Abdominal:   Soft. Normal appearance and bowel sounds are normal.      There is no tenderness.      There is no rebound and no CVA tenderness.   Rectal:   No external hemorrhoids present but an apparent small anal fissure present.  Musculoskeletal:  Extremities atraumatic x 4.   Lymphadenopathy:  No cervical adenopathy.   Neurological:   Alert and oriented to person, place, and time. Normal strength.      No cranial nerve deficit or sensory deficit. GCS eye subscore is 4. GCS verbal subscore is 5. GCS motor subscore is 6.   Skin:    Skin is intact. No rash noted.   Psychiatric:   Normal mood and affect.      Diagnostics     Lab Results   Labs Ordered and Resulted from Time of ED Arrival to Time of ED Departure - No data to display    Imaging   No orders to display       Independent Interpretation   None    ED Course      Medications Administered   Medications - No data to display      Procedures   Procedures     Discussion of Management   None    ED Course   ED Course as of 08/23/24 0538   Thu Aug 22, 2024   2238 I obtained the history and examined the patient as above.        Additional Documentation  None    Medical Decision Making / Diagnosis     CMS Diagnoses: None    MIPS   None    [facility]   Francisco Abernathy is a 54 year old male who came in for further evaluation of rectal pain.  He appears to likely have an anal fissure, as I do not appreciate any external hemorrhoids.  I do not feel that he requires any imaging or testing.  I will prescribe Calmoseptine ointment as well as additional lidocaine cream.  I also recommended he do sitz bath's to help with the discomfort.  I am providing him the contact information for the colorectal surgery clinic for follow-up as well.  He should return with any concerns.    Disposition   The patient was discharged.     Diagnosis     ICD-10-CM    1. Rectal pain  K62.89       2. Anal fissure  K60.2            Discharge Medications    Discharge Medication List as of 8/22/2024 11:11 PM        START taking these medications    Details   lidocaine (LMX4) 4 % external cream Apply topically 3 times daily as needed for moderate pain.Disp-120 g, R-0Local Print      menthol-zinc oxide (CALMOSEPTINE) 0.44-20.6 % OINT ointment Apply topically 4 times daily as needed for skin protection., Disp-100 g, R-0, Local Print               Scribe Disclosure:  I, Phoenix Peterson, am serving as a scribe at 10:33 PM on 8/22/2024 to document services personally performed by Nick Madrid MD based on my observations and the provider's statements to me.        Nick Madrid MD  08/23/24 [id number]

## 2024-08-23 NOTE — ED TRIAGE NOTES
Using : reports rectal pain and itching started 2 weeks ago. States pain worse with BMs, states BMs have been soft denies any blood in stool or when wiping. Denies any N/V     Triage Assessment (Adult)       Row Name 08/22/24 2032          Triage Assessment    Airway WDL WDL        Respiratory WDL    Respiratory WDL WDL        Skin Circulation/Temperature WDL    Skin Circulation/Temperature WDL WDL        Cardiac WDL    Cardiac WDL WDL        Peripheral/Neurovascular WDL    Peripheral Neurovascular WDL WDL        Cognitive/Neuro/Behavioral WDL    Cognitive/Neuro/Behavioral WDL WDL

## 2024-08-27 ENCOUNTER — HOSPITAL ENCOUNTER (EMERGENCY)
Facility: CLINIC | Age: 54
Discharge: HOME | End: 2024-08-27
Attending: EMERGENCY MEDICINE | Admitting: EMERGENCY MEDICINE
Payer: COMMERCIAL

## 2024-08-27 VITALS
BODY MASS INDEX: 25.9 KG/M2 | OXYGEN SATURATION: 98 % | SYSTOLIC BLOOD PRESSURE: 128 MMHG | DIASTOLIC BLOOD PRESSURE: 83 MMHG | WEIGHT: 165.34 LBS | RESPIRATION RATE: 19 BRPM | HEART RATE: 81 BPM | TEMPERATURE: 98.2 F

## 2024-08-27 DIAGNOSIS — S61.213A LACERATION OF LEFT MIDDLE FINGER WITHOUT FOREIGN BODY WITHOUT DAMAGE TO NAIL, INITIAL ENCOUNTER: ICD-10-CM

## 2024-08-27 PROCEDURE — 12001 RPR S/N/AX/GEN/TRNK 2.5CM/<: CPT

## 2024-08-27 PROCEDURE — 250N000009 HC RX 250: Performed by: EMERGENCY MEDICINE

## 2024-08-27 PROCEDURE — 99283 EMERGENCY DEPT VISIT LOW MDM: CPT

## 2024-08-27 RX ADMIN — Medication 3 ML: at 22:21

## 2024-08-27 ASSESSMENT — ACTIVITIES OF DAILY LIVING (ADL): ADLS_ACUITY_SCORE: 33

## 2024-08-27 ASSESSMENT — COLUMBIA-SUICIDE SEVERITY RATING SCALE - C-SSRS
6. HAVE YOU EVER DONE ANYTHING, STARTED TO DO ANYTHING, OR PREPARED TO DO ANYTHING TO END YOUR LIFE?: NO
1. IN THE PAST MONTH, HAVE YOU WISHED YOU WERE DEAD OR WISHED YOU COULD GO TO SLEEP AND NOT WAKE UP?: NO
2. HAVE YOU ACTUALLY HAD ANY THOUGHTS OF KILLING YOURSELF IN THE PAST MONTH?: NO

## 2024-08-28 NOTE — ED PROVIDER NOTES
Emergency Department Note      History of Present Illness     Chief Complaint   Hand Injury      HPI   Francisco Abernathy is a 54 year old male presents to the ED at 2200 on a Tuesday evening complaining of lacerations to his fingers on his left hand.  The patient was working on his car.  The patient was pulling his hand out of the car engine when a sharp piece of plastic scraped against the back of his fingers.  The patient sustained a 2 cm laceration to the dorsum of the middle finger, between the DIP and PIP joints.  He also has smaller superficial lacerations to the ring finger and small finger.      Independent Historian   None    Review of External Notes   None    Past Medical History     Medical History and Problem List   Past Medical History:   Diagnosis Date    Diabetes (H)        Medications   HYDROcodone-acetaminophen (NORCO) 5-325 MG tablet  ibuprofen (ADVIL/MOTRIN) 600 MG tablet  lidocaine (LMX4) 4 % external cream  menthol-zinc oxide (CALMOSEPTINE) 0.44-20.6 % OINT ointment  oxyCODONE (ROXICODONE) 5 MG tablet  tamsulosin (FLOMAX) 0.4 MG capsule        Surgical History   No past surgical history on file.    Physical Exam     Patient Vitals for the past 24 hrs:   BP Temp Temp src Pulse Resp SpO2 Weight   08/27/24 2202 (!) 146/82 98.2  F (36.8  C) Oral 106 19 99 % 75 kg (165 lb 5.5 oz)     Physical Exam  General: Alert, No distress. Nontoxic appearance  Head: No signs of trauma.   Mouth/Throat: Oropharynx moist.   Eyes: Conjunctivae are normal. Pupils are equal..   Neck: Normal range of motion.    CV: Appears well perfused.  Resp:No respiratory distress.   MSK: Normal range of motion. No obvious deformity.   Neuro: The patient is alert and interactive. KIMBALL. Speech normal. GCS 15  Skin: No lesion or sign of trauma noted.   2 cm laceration to the dorsum of the left middle finger between the PIP and DIP joints.  No tendon involvement.  CMS intact distally.  Psych: normal mood and affect. behavior is normal.       Diagnostics     Lab Results   Labs Ordered and Resulted from Time of ED Arrival to Time of ED Departure - No data to display    Imaging   No orders to display         ED Course      Medications Administered   Medications   lido-EPINEPHrine-tetracaine (LET) topical gel GEL (3 mLs Topical $Given 8/27/24 2225)       Procedures     Laceration Repair      Procedure: Laceration Repair    Indication: Laceration    Consent: Verbal    Tetanus status reviewed    Location: Left L third (middle) finger    Length: 2 cm    Preparation: Irrigation with Sterile Saline.    Anesthesia/Sedation: Lidocaine - 1%      Treatment/Exploration: Wound explored, no foreign bodies found     Closure: The wound was closed with one layer. Skin/superficial layer was closed with 2 x 5-0 Nylon using Interrupted sutures.     Patient Status: The patient tolerated the procedure well: Yes. There were no complications.     Discussion of Management   None    ED Course        Additional Documentation  None    Medical Decision Making / Diagnosis       MICHAEL   Francisco Abernathy is a 54 year old male The patient presented with a laceration.  The wound was carefully evaluated and explored.  The laceration was closed with sutures/staples as noted above.  There is no evidence of muscular, tendon, or bony damage with this laceration.  No signs of foreign body.  Possible complications (infection, scarring) were reviewed with the patient.  Follow up with primary care will be indicated for suture/staple removal as noted in the discharge section.      Disposition   The patient was discharged.     Diagnosis     ICD-10-CM    1. Laceration of left middle finger without foreign body without damage to nail, initial encounter  S61.213A                 Yadiel Persaud MD  08/27/24 4310

## 2024-08-28 NOTE — ED TRIAGE NOTES
Jesus  used. Pt was working on his car when he cut his finger with a part of the engine. 1-2 cm lacs to middle and pinky fingers.

## 2024-09-07 ENCOUNTER — HOSPITAL ENCOUNTER (EMERGENCY)
Facility: CLINIC | Age: 54
Discharge: HOME | End: 2024-09-07
Admitting: EMERGENCY MEDICINE
Payer: COMMERCIAL

## 2024-09-07 PROCEDURE — 99281 EMR DPT VST MAYX REQ PHY/QHP: CPT

## 2024-09-07 ASSESSMENT — COLUMBIA-SUICIDE SEVERITY RATING SCALE - C-SSRS
2. HAVE YOU ACTUALLY HAD ANY THOUGHTS OF KILLING YOURSELF IN THE PAST MONTH?: NO
1. IN THE PAST MONTH, HAVE YOU WISHED YOU WERE DEAD OR WISHED YOU COULD GO TO SLEEP AND NOT WAKE UP?: NO
6. HAVE YOU EVER DONE ANYTHING, STARTED TO DO ANYTHING, OR PREPARED TO DO ANYTHING TO END YOUR LIFE?: NO

## 2025-05-20 ENCOUNTER — HOSPITAL ENCOUNTER (EMERGENCY)
Facility: CLINIC | Age: 55
Discharge: HOME | End: 2025-05-21
Attending: EMERGENCY MEDICINE | Admitting: EMERGENCY MEDICINE
Payer: COMMERCIAL

## 2025-05-20 ENCOUNTER — APPOINTMENT (OUTPATIENT)
Dept: GENERAL RADIOLOGY | Facility: CLINIC | Age: 55
End: 2025-05-20
Attending: EMERGENCY MEDICINE
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
## 2025-05-20 DIAGNOSIS — S80.01XA CONTUSION OF RIGHT KNEE, INITIAL ENCOUNTER: ICD-10-CM

## 2025-05-20 DIAGNOSIS — S16.1XXA CERVICAL STRAIN, INITIAL ENCOUNTER: ICD-10-CM

## 2025-05-20 DIAGNOSIS — M54.50 ACUTE RIGHT-SIDED LOW BACK PAIN, UNSPECIFIED WHETHER SCIATICA PRESENT: ICD-10-CM

## 2025-05-20 DIAGNOSIS — R03.0 ELEVATED BLOOD PRESSURE READING: ICD-10-CM

## 2025-05-20 PROCEDURE — 71046 X-RAY EXAM CHEST 2 VIEWS: CPT

## 2025-05-20 PROCEDURE — 99284 EMERGENCY DEPT VISIT MOD MDM: CPT | Mod: 25

## 2025-05-20 ASSESSMENT — ACTIVITIES OF DAILY LIVING (ADL)
ADLS_ACUITY_SCORE: 41
ADLS_ACUITY_SCORE: 41

## 2025-05-20 ASSESSMENT — COLUMBIA-SUICIDE SEVERITY RATING SCALE - C-SSRS
1. IN THE PAST MONTH, HAVE YOU WISHED YOU WERE DEAD OR WISHED YOU COULD GO TO SLEEP AND NOT WAKE UP?: NO
2. HAVE YOU ACTUALLY HAD ANY THOUGHTS OF KILLING YOURSELF IN THE PAST MONTH?: NO
6. HAVE YOU EVER DONE ANYTHING, STARTED TO DO ANYTHING, OR PREPARED TO DO ANYTHING TO END YOUR LIFE?: NO

## 2025-05-20 NOTE — Clinical Note
Francisco Abernathy was seen and treated in our emergency department on 5/20/2025.  He may return to work on 05/24/2025.       If you have any questions or concerns, please don't hesitate to call.      Saul Celis MD

## 2025-05-21 VITALS
HEART RATE: 74 BPM | OXYGEN SATURATION: 99 % | RESPIRATION RATE: 18 BRPM | DIASTOLIC BLOOD PRESSURE: 103 MMHG | SYSTOLIC BLOOD PRESSURE: 172 MMHG | TEMPERATURE: 98.2 F

## 2025-05-21 PROCEDURE — 250N000013 HC RX MED GY IP 250 OP 250 PS 637: Performed by: EMERGENCY MEDICINE

## 2025-05-21 RX ORDER — ACETAMINOPHEN 500 MG
1000 TABLET ORAL EVERY 6 HOURS PRN
Qty: 30 TABLET | Refills: 0 | Status: SHIPPED | OUTPATIENT
Start: 2025-05-21 | End: 2025-05-21

## 2025-05-21 RX ORDER — OXYCODONE HYDROCHLORIDE 5 MG/1
5 TABLET ORAL ONCE
Refills: 0 | Status: COMPLETED | OUTPATIENT
Start: 2025-05-21 | End: 2025-05-21

## 2025-05-21 RX ORDER — ACETAMINOPHEN 500 MG
1000 TABLET ORAL EVERY 6 HOURS PRN
Qty: 30 TABLET | Refills: 0 | Status: SHIPPED | OUTPATIENT
Start: 2025-05-21

## 2025-05-21 RX ORDER — IBUPROFEN 600 MG/1
600 TABLET, FILM COATED ORAL EVERY 6 HOURS PRN
Qty: 20 TABLET | Refills: 0 | Status: SHIPPED | OUTPATIENT
Start: 2025-05-21

## 2025-05-21 RX ORDER — IBUPROFEN 600 MG/1
600 TABLET, FILM COATED ORAL EVERY 6 HOURS PRN
Qty: 20 TABLET | Refills: 0 | Status: SHIPPED | OUTPATIENT
Start: 2025-05-21 | End: 2025-05-21

## 2025-05-21 RX ADMIN — OXYCODONE HYDROCHLORIDE 5 MG: 5 TABLET ORAL at 03:58

## 2025-05-21 ASSESSMENT — ACTIVITIES OF DAILY LIVING (ADL)
ADLS_ACUITY_SCORE: 41
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
